# Patient Record
Sex: FEMALE | Race: BLACK OR AFRICAN AMERICAN | Employment: UNEMPLOYED | URBAN - METROPOLITAN AREA
[De-identification: names, ages, dates, MRNs, and addresses within clinical notes are randomized per-mention and may not be internally consistent; named-entity substitution may affect disease eponyms.]

---
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
## 2018-01-17 ENCOUNTER — OFFICE VISIT (OUTPATIENT)
Dept: INTERNAL MEDICINE CLINIC | Age: 18
End: 2018-01-17

## 2018-01-17 ENCOUNTER — INITIAL CONSULT (OUTPATIENT)
Dept: PSYCHIATRY | Age: 18
End: 2018-01-17

## 2018-01-17 VITALS
BODY MASS INDEX: 16.46 KG/M2 | WEIGHT: 108.6 LBS | OXYGEN SATURATION: 98 % | SYSTOLIC BLOOD PRESSURE: 100 MMHG | HEIGHT: 68 IN | HEART RATE: 96 BPM | DIASTOLIC BLOOD PRESSURE: 70 MMHG | TEMPERATURE: 98.7 F

## 2018-01-17 DIAGNOSIS — F33.1 MODERATE EPISODE OF RECURRENT MAJOR DEPRESSIVE DISORDER (HCC): Primary | ICD-10-CM

## 2018-01-17 DIAGNOSIS — E55.9 VITAMIN D DEFICIENCY: ICD-10-CM

## 2018-01-17 DIAGNOSIS — G47.09 OTHER INSOMNIA: ICD-10-CM

## 2018-01-17 DIAGNOSIS — E78.2 MIXED HYPERLIPIDEMIA: Primary | ICD-10-CM

## 2018-01-17 DIAGNOSIS — R63.6 UNDERWEIGHT: ICD-10-CM

## 2018-01-17 DIAGNOSIS — R53.83 OTHER FATIGUE: ICD-10-CM

## 2018-01-17 DIAGNOSIS — M25.562 LEFT KNEE PAIN, UNSPECIFIED CHRONICITY: ICD-10-CM

## 2018-01-17 DIAGNOSIS — F41.8 DEPRESSION WITH ANXIETY: ICD-10-CM

## 2018-01-17 PROCEDURE — 99214 OFFICE O/P EST MOD 30 MIN: CPT | Performed by: INTERNAL MEDICINE

## 2018-01-17 PROCEDURE — 99214 OFFICE O/P EST MOD 30 MIN: CPT | Performed by: PSYCHIATRY & NEUROLOGY

## 2018-01-17 RX ORDER — SERTRALINE HYDROCHLORIDE 100 MG/1
150 TABLET, FILM COATED ORAL DAILY
Qty: 60 TABLET | Refills: 2 | Status: SHIPPED | OUTPATIENT
Start: 2018-01-17 | End: 2018-05-02 | Stop reason: SDUPTHER

## 2018-01-17 RX ORDER — MELATONIN
1000 DAILY
Qty: 30 TABLET | Refills: 3 | Status: SHIPPED | OUTPATIENT
Start: 2018-01-17 | End: 2018-11-28 | Stop reason: SDUPTHER

## 2018-01-17 RX ORDER — M-VIT,TX,IRON,MINS/CALC/FOLIC 27MG-0.4MG
1 TABLET ORAL DAILY
Qty: 30 TABLET | Refills: 3 | Status: SHIPPED | OUTPATIENT
Start: 2018-01-17 | End: 2019-01-17

## 2018-01-17 ASSESSMENT — ENCOUNTER SYMPTOMS
GASTROINTESTINAL NEGATIVE: 1
EYES NEGATIVE: 1
RESPIRATORY NEGATIVE: 1

## 2018-01-17 NOTE — PROGRESS NOTES
Medication Sig Dispense Refill    sertraline (ZOLOFT) 100 MG tablet Take 1.5 tablets by mouth daily 60 tablet 2    Cholecalciferol (VITAMIN D3) 1000 units TABS Take 1 tablet by mouth daily 30 tablet 3    Multiple Vitamins-Minerals (THERAPEUTIC MULTIVITAMIN-MINERALS) tablet Take 1 tablet by mouth daily 30 tablet 3     No current facility-administered medications for this visit.         Labs/Imaging/EKG:    Orders Only on 08/09/2017   Component Date Value Ref Range Status    Hyaline Casts, UA 08/09/2017 9* 0 - 8 /LPF Final    WBC, UA 08/09/2017 1  0 - 5 /HPF Final    RBC, UA 08/09/2017 5* 0 - 4 /HPF Final    Epi Cells 08/09/2017 2  0 - 5 /HPF Final   Orders Only on 08/09/2017   Component Date Value Ref Range Status    WBC 08/09/2017 6.4  4.0 - 11.0 K/uL Final    RBC 08/09/2017 5.01  4.00 - 5.20 M/uL Final    Hemoglobin 08/09/2017 12.4  12.0 - 16.0 g/dL Final    Hematocrit 08/09/2017 38.9  36.0 - 48.0 % Final    MCV 08/09/2017 77.8* 80.0 - 100.0 fL Final    MCH 08/09/2017 24.7* 26.0 - 34.0 pg Final    MCHC 08/09/2017 31.8  31.0 - 36.0 g/dL Final    RDW 08/09/2017 18.2* 12.4 - 15.4 % Final    Platelets 14/20/6787 251  135 - 450 K/uL Final    MPV 08/09/2017 7.5  5.0 - 10.5 fL Final    Neutrophils % 08/09/2017 59.3  % Final    Lymphocytes % 08/09/2017 31.7  % Final    Monocytes % 08/09/2017 6.9  % Final    Eosinophils % 08/09/2017 1.4  % Final    Basophils % 08/09/2017 0.7  % Final    Neutrophils # 08/09/2017 3.8  1.7 - 7.7 K/uL Final    Lymphocytes # 08/09/2017 2.0  1.0 - 5.1 K/uL Final    Monocytes # 08/09/2017 0.4  0.0 - 1.3 K/uL Final    Eosinophils # 08/09/2017 0.1  0.0 - 0.6 K/uL Final    Basophils # 08/09/2017 0.0  0.0 - 0.2 K/uL Final    TSH 08/09/2017 2.21  0.43 - 4.00 uIU/mL Final    Sodium 08/09/2017 143  136 - 145 mmol/L Final    Potassium 08/09/2017 4.2  3.5 - 5.1 mmol/L Final    Chloride 08/09/2017 102  99 - 110 mmol/L Final    CO2 08/09/2017 21  21 - 32 mmol/L Final    Anion Gap 08/09/2017 20* 3 - 16 Final    Glucose 08/09/2017 76  70 - 99 mg/dL Final    BUN 08/09/2017 15  7 - 20 mg/dL Final    CREATININE 08/09/2017 0.6  0.6 - 1.1 mg/dL Final    GFR Non- 08/09/2017 >60  >60 Final    GFR  08/09/2017 >60  >60 Final    Calcium 08/09/2017 9.8  8.3 - 10.6 mg/dL Final    Hemoglobin A1C 08/10/2017 5.0  See comment % Final    eAG 08/10/2017 96.8  mg/dL Final    Total Protein 08/09/2017 7.9  6.4 - 8.2 g/dL Final    Alb 08/09/2017 4.8  3.4 - 5.0 g/dL Final    Alkaline Phosphatase 08/09/2017 49  40 - 129 U/L Final    ALT 08/09/2017 8* 10 - 40 U/L Final    AST 08/09/2017 19  15 - 37 U/L Final    Total Bilirubin 08/09/2017 0.3  0.0 - 1.0 mg/dL Final    Bilirubin, Direct 08/09/2017 <0.2  0.0 - 0.3 mg/dL Final    Bilirubin, Indirect 08/09/2017 see below  0.0 - 1.0 mg/dL Final    Cholesterol, Total 08/09/2017 259* 0 - 199 mg/dL Final    Triglycerides 08/09/2017 79  0 - 150 mg/dL Final    HDL 08/09/2017 101* 40 - 60 mg/dL Final    LDL Calculated 08/09/2017 142* <100 mg/dL Final    VLDL Cholesterol Calculated 08/09/2017 16  Not Established mg/dL Final    Vit D, 25-Hydroxy 08/09/2017 31.0  >=30 ng/mL Final    Vitamin B-12 08/09/2017 724  211 - 911 pg/mL Final    Folate 08/09/2017 12.93  4.78 - 24.20 ng/mL Final   Office Visit on 08/07/2017   Component Date Value Ref Range Status    Color, UA 08/09/2017 DK YELLOW  Straw/Yellow Final    Clarity, UA 08/09/2017 Clear  Clear Final    Glucose, Ur 08/09/2017 Negative  Negative mg/dL Final    Bilirubin Urine 08/09/2017 Negative  Negative Final    Ketones, Urine 08/09/2017 Negative  Negative mg/dL Final    Specific Gravity, UA 08/09/2017 >1.030  1.005 - 1.030 Final    Blood, Urine 08/09/2017 Negative  Negative Final    pH, UA 08/09/2017 6.0  5.0 - 8.0 Final    Protein, UA 08/09/2017 TRACE* Negative mg/dL Final    Urobilinogen, Urine 08/09/2017 1.0  <2.0 E.U./dL Final    Nitrite, Urine

## 2018-01-17 NOTE — PATIENT INSTRUCTIONS
TAKE MED AS ADVISED    DIET/ EXERCISE.     FOLLOW UP WITHIN 3 MONTHS / AS NEEDED    FOLLOW UP FOR FASTING LABS, PSYCHIATRIST

## 2018-01-17 NOTE — PROGRESS NOTES
SUBJECTIVE:  Pillo Aly is a 21 y.o. female [address] Complaint   Patient presents with    Depression     follow up    Hyperlipidemia    Other        PT HERE FOR EVAL    HLP -  + EXERCISE / DIET COMPLIANCE . PERSISTENT. LABS D/W PT  DEPRESSION / ANXIETY -  TAKING ZOLOFT - TOLERATING. + INSOMNIA.  + LACK OF MOTIVATION ,  OCC LOSS OF INTEREST, DENIES SUICIDAL / NO HOMICIDAL THOUGHTS / IDEATION. INSOMNIA - ? DURATION  VIT D DEF -  TAKING MED .  LABS D/W PT  FATIGUE - PERSISTENT. NO COLD / NO HEAT INTOLERANCE  LT KNEE PAIN - IMPROVING. DULL ACHE, NO RAD. PAIN FREE AT THIS TIME. UNDERWT - DIET REVIEWED. WT NOTED    DENIES CP, No SOB, No PALPITATIONS, No COUGH  No ABD PAIN, No N/V, No DIARRHEA, No CONSTIPATION, No MELENA, No HEMATOCHEZIA. No DYSURIA, No FREQ, No URGENCY, NoHEMATURIA    PMH: REVIEWED    PSH: REVIEWED:    ALLERGY:  Review of patient's allergies indicates no known allergies. MEDS: REVIEWED  Medication Sig Taking? Cholecalciferol (VITAMIN D3) 1000 units TABS TAKE 1 TABLET BY MOUTH DAILY    sertraline (ZOLOFT) 100 MG tablet TAKE 1 TABLET BY MOUTH DAILY    sertraline (ZOLOFT) 100 MG tablet Take 1 tablet by mouth daily    Multiple Vitamins-Minerals (THERAPEUTIC MULTIVITAMIN-MINERALS) tablet Take 1 tablet by mouth daily          ROS: COMPREHENSIVE ROS AS IN HX, REST -VE  History obtained from chart review and the patient    OBJECTIVE:   NURSING NOTE AND VITALS REVIEWED  BP 98/62 (Site: Right Arm, Position: Sitting, Cuff Size: Medium Adult)   Pulse 104   Temp 98.7 °F (37.1 °C) (Oral)   Ht 5' 7.5\" (1.715 m)   Wt 108 lb 9.6 oz (49.3 kg)   LMP 12/24/2017 (Approximate)   SpO2 98%   BMI 16.76 kg/m²     NO ACUTE DISTRESS    REPEAT BP:  100/70 (RT), NO ORTHOSTASIS     REPEAT PULSE: 96 -  MANUAL    Body mass index is 16.76 kg/m².      HEENT: NO PALLOR, ANICTERIC, PERRLA, EOMI, NO CONJUNCTIVAL ERYTHEMA,                 NO SINUS TENDERNESS  NECK:  SUPPLE, TRACHEA MIDLINE, NT, NO JVD, NO CB, MEDICATION SIDE EFFECTS D/W PATIENT    PT STABLE AT TIME OF D/C.    RETURN TO CLINIC WITHIN 3 MONTHS / PRN    FOLLOW UP FOR FASTING LABS, PSYCHIATRIST

## 2018-02-01 DIAGNOSIS — E55.9 VITAMIN D DEFICIENCY: ICD-10-CM

## 2018-02-01 DIAGNOSIS — E78.2 MIXED HYPERLIPIDEMIA: ICD-10-CM

## 2018-02-01 DIAGNOSIS — R63.6 UNDERWEIGHT: ICD-10-CM

## 2018-02-01 DIAGNOSIS — R53.83 OTHER FATIGUE: ICD-10-CM

## 2018-02-01 LAB
ALBUMIN SERPL-MCNC: 4.5 G/DL (ref 3.4–5)
ALP BLD-CCNC: 45 U/L (ref 40–129)
ALT SERPL-CCNC: <5 U/L (ref 10–40)
ANION GAP SERPL CALCULATED.3IONS-SCNC: 15 MMOL/L (ref 3–16)
AST SERPL-CCNC: 21 U/L (ref 15–37)
BILIRUB SERPL-MCNC: 0.3 MG/DL (ref 0–1)
BILIRUBIN DIRECT: <0.2 MG/DL (ref 0–0.3)
BILIRUBIN, INDIRECT: ABNORMAL MG/DL (ref 0–1)
BUN BLDV-MCNC: 9 MG/DL (ref 7–20)
CALCIUM SERPL-MCNC: 9.4 MG/DL (ref 8.3–10.6)
CHLORIDE BLD-SCNC: 102 MMOL/L (ref 99–110)
CHOLESTEROL, TOTAL: 226 MG/DL (ref 0–199)
CO2: 25 MMOL/L (ref 21–32)
CREAT SERPL-MCNC: 0.6 MG/DL (ref 0.6–1.1)
GFR AFRICAN AMERICAN: >60
GFR NON-AFRICAN AMERICAN: >60
GLUCOSE BLD-MCNC: 72 MG/DL (ref 70–99)
HDLC SERPL-MCNC: 74 MG/DL (ref 40–60)
LDL CHOLESTEROL CALCULATED: 133 MG/DL
POTASSIUM SERPL-SCNC: 4.4 MMOL/L (ref 3.5–5.1)
SODIUM BLD-SCNC: 142 MMOL/L (ref 136–145)
TOTAL PROTEIN: 7.8 G/DL (ref 6.4–8.2)
TRIGL SERPL-MCNC: 96 MG/DL (ref 0–150)
VITAMIN D 25-HYDROXY: 35.2 NG/ML
VLDLC SERPL CALC-MCNC: 19 MG/DL

## 2018-02-21 ENCOUNTER — INITIAL CONSULT (OUTPATIENT)
Dept: PSYCHIATRY | Age: 18
End: 2018-02-21

## 2018-02-21 VITALS
WEIGHT: 107 LBS | BODY MASS INDEX: 16.22 KG/M2 | OXYGEN SATURATION: 97 % | DIASTOLIC BLOOD PRESSURE: 68 MMHG | HEIGHT: 68 IN | HEART RATE: 87 BPM | SYSTOLIC BLOOD PRESSURE: 90 MMHG

## 2018-02-21 DIAGNOSIS — F33.0 MILD EPISODE OF RECURRENT MAJOR DEPRESSIVE DISORDER (HCC): Primary | ICD-10-CM

## 2018-02-21 PROCEDURE — 99214 OFFICE O/P EST MOD 30 MIN: CPT | Performed by: PSYCHIATRY & NEUROLOGY

## 2018-02-21 ASSESSMENT — ENCOUNTER SYMPTOMS
EYES NEGATIVE: 1
GASTROINTESTINAL NEGATIVE: 1
RESPIRATORY NEGATIVE: 1

## 2018-02-21 NOTE — PROGRESS NOTES
Outpatient Psychiatric Progress Note  Michelle Kilgore M.D. Date: 2/21/2018    Total Duration of Visit: 25min    Vital Signs: BP 90/68   Pulse 87   Ht 5' 7.5\" (1.715 m)   Wt 107 lb (48.5 kg)   SpO2 97%   BMI 16.51 kg/m²     Chief Complaint/Reason For Visit:   Chief Complaint   Patient presents with    Depression       Interval History: Some improvement since increasing zoloft. Less depressed, less solation (making plans with friends), slight more motivation (cleaned room), eating more (weight same), sleep good. Focus good. No anxiety. Started seeing therapist at [facility]. Has only met one time. Continues to be dependent on mother. Does not have job, school, extra curricular. No sense of accomplishment or purpose. Pt easily rejects ideas that would support her independence. Mother may need to be involved if she is enabling. No SI/HI. MSE:     Appropriate grooming and hygiene. Appears stated age. reduced eye contact. Speech spontaneous and non-pressured. Mood depressed  Affect congruent. TP linear. TC free of SI/HI, no evidence of IOR/IOP. Pt denies any perceptual abnormalities. The pt did not exhibit abnormal movements or tremor. The pt was not restless or agitated. The pt was alert and oriented in all spheres. Pt demonstrated good fund of knowledge and good recall of recent and remote events. The patient demonstrated an appropriate level of insight into their diagnosis and treatment recommendations, including risks, benefits and alternatives of the recommended medications and therapy interventions. Review of Systems   HENT: Negative. Eyes: Negative. Respiratory: Negative. Cardiovascular: Negative. Gastrointestinal: Negative. Skin: Negative. Neurological: Negative. Endo/Heme/Allergies: Negative. Psychiatric/Behavioral: Positive for depression.          Current Medications:   Current Outpatient Prescriptions   Medication Sig Dispense Refill    Cholecalciferol in her environment to help her feel better. Continue zoloft  150mg daily. Reviewed R/B/SE. Continue therapy, rec more frequent visits. Encouraged work, school, or volunteer work to help structure day and gain sense of accomplishment. No safety concerns.   F/U in 1

## 2018-02-24 DIAGNOSIS — E55.9 VITAMIN D DEFICIENCY: ICD-10-CM

## 2018-02-26 RX ORDER — MELATONIN
1000 DAILY
Qty: 30 TABLET | Refills: 0 | Status: SHIPPED | OUTPATIENT
Start: 2018-02-26 | End: 2018-11-28 | Stop reason: SDUPTHER

## 2018-03-16 ENCOUNTER — TELEPHONE (OUTPATIENT)
Dept: INTERNAL MEDICINE CLINIC | Age: 18
End: 2018-03-16

## 2018-05-02 ENCOUNTER — INITIAL CONSULT (OUTPATIENT)
Dept: PSYCHIATRY | Age: 18
End: 2018-05-02

## 2018-05-02 DIAGNOSIS — F33.42 RECURRENT MAJOR DEPRESSIVE DISORDER, IN FULL REMISSION (HCC): ICD-10-CM

## 2018-05-02 PROCEDURE — 99213 OFFICE O/P EST LOW 20 MIN: CPT | Performed by: PSYCHIATRY & NEUROLOGY

## 2018-05-02 RX ORDER — SERTRALINE HYDROCHLORIDE 100 MG/1
150 TABLET, FILM COATED ORAL DAILY
Qty: 60 TABLET | Refills: 5 | Status: SHIPPED | OUTPATIENT
Start: 2018-05-02 | End: 2018-05-05 | Stop reason: SDUPTHER

## 2018-05-02 ASSESSMENT — ENCOUNTER SYMPTOMS
GASTROINTESTINAL NEGATIVE: 1
EYES NEGATIVE: 1
RESPIRATORY NEGATIVE: 1

## 2018-05-05 DIAGNOSIS — F33.42 RECURRENT MAJOR DEPRESSIVE DISORDER, IN FULL REMISSION (HCC): ICD-10-CM

## 2018-05-08 RX ORDER — SERTRALINE HYDROCHLORIDE 100 MG/1
TABLET, FILM COATED ORAL
Qty: 135 TABLET | Refills: 5 | Status: SHIPPED | OUTPATIENT
Start: 2018-05-08 | End: 2018-05-15

## 2018-05-11 DIAGNOSIS — F33.1 MODERATE EPISODE OF RECURRENT MAJOR DEPRESSIVE DISORDER (HCC): ICD-10-CM

## 2018-05-15 RX ORDER — SERTRALINE HYDROCHLORIDE 100 MG/1
TABLET, FILM COATED ORAL
Qty: 60 TABLET | Refills: 0 | Status: SHIPPED | OUTPATIENT
Start: 2018-05-15 | End: 2018-11-28

## 2018-11-28 ENCOUNTER — OFFICE VISIT (OUTPATIENT)
Dept: INTERNAL MEDICINE CLINIC | Age: 18
End: 2018-11-28
Payer: COMMERCIAL

## 2018-11-28 VITALS
SYSTOLIC BLOOD PRESSURE: 110 MMHG | HEIGHT: 68 IN | DIASTOLIC BLOOD PRESSURE: 70 MMHG | WEIGHT: 116.4 LBS | BODY MASS INDEX: 17.64 KG/M2 | HEART RATE: 75 BPM | OXYGEN SATURATION: 95 % | TEMPERATURE: 97.9 F

## 2018-11-28 DIAGNOSIS — G47.09 OTHER INSOMNIA: ICD-10-CM

## 2018-11-28 DIAGNOSIS — R53.83 OTHER FATIGUE: ICD-10-CM

## 2018-11-28 DIAGNOSIS — E78.2 MIXED HYPERLIPIDEMIA: ICD-10-CM

## 2018-11-28 DIAGNOSIS — E55.9 VITAMIN D DEFICIENCY: ICD-10-CM

## 2018-11-28 DIAGNOSIS — F33.40 RECURRENT MAJOR DEPRESSIVE DISORDER, IN REMISSION (HCC): Primary | ICD-10-CM

## 2018-11-28 DIAGNOSIS — R63.6 UNDERWEIGHT: ICD-10-CM

## 2018-11-28 DIAGNOSIS — Z23 NEED FOR IMMUNIZATION AGAINST INFLUENZA: ICD-10-CM

## 2018-11-28 PROCEDURE — 99214 OFFICE O/P EST MOD 30 MIN: CPT | Performed by: INTERNAL MEDICINE

## 2018-11-28 PROCEDURE — 90686 IIV4 VACC NO PRSV 0.5 ML IM: CPT | Performed by: INTERNAL MEDICINE

## 2018-11-28 PROCEDURE — 90471 IMMUNIZATION ADMIN: CPT | Performed by: INTERNAL MEDICINE

## 2018-11-28 RX ORDER — MELATONIN
1000 DAILY
Qty: 30 TABLET | Refills: 0 | Status: SHIPPED | OUTPATIENT
Start: 2018-11-28 | End: 2018-11-30 | Stop reason: SDUPTHER

## 2018-11-28 RX ORDER — BUPROPION HYDROCHLORIDE 150 MG/1
150 TABLET ORAL EVERY MORNING
Qty: 30 TABLET | Refills: 2 | Status: SHIPPED | OUTPATIENT
Start: 2018-11-28 | End: 2019-03-19 | Stop reason: ALTCHOICE

## 2018-11-28 NOTE — PROGRESS NOTES
Barriers to medication compliance addressed. All patient questions answered. Pt voiced understanding.        MEDICATION SIDE EFFECTS D/W PATIENT    PT STABLE AT TIME OF D/C.    RETURN TO CLINIC WITHIN 2 MONTHS / PRN    FOLLOW UP FOR FASTING LABS

## 2018-11-28 NOTE — PATIENT INSTRUCTIONS
TAKE MED AS ADVISED    DIET/ EXERCISE.     FOLLOW UP WITHIN 2 MONTHS / AS NEEDED    FOLLOW UP FOR FASTING LABS

## 2018-12-19 DIAGNOSIS — E55.9 VITAMIN D DEFICIENCY: ICD-10-CM

## 2018-12-19 DIAGNOSIS — E78.2 MIXED HYPERLIPIDEMIA: ICD-10-CM

## 2018-12-19 DIAGNOSIS — F33.40 RECURRENT MAJOR DEPRESSIVE DISORDER, IN REMISSION (HCC): ICD-10-CM

## 2018-12-19 DIAGNOSIS — R53.83 OTHER FATIGUE: ICD-10-CM

## 2018-12-19 LAB
A/G RATIO: 1.4 (ref 1.1–2.2)
ALBUMIN SERPL-MCNC: 4.6 G/DL (ref 3.4–5)
ALP BLD-CCNC: 52 U/L (ref 40–129)
ALT SERPL-CCNC: <5 U/L (ref 10–40)
ANION GAP SERPL CALCULATED.3IONS-SCNC: 14 MMOL/L (ref 3–16)
AST SERPL-CCNC: 14 U/L (ref 15–37)
BASOPHILS ABSOLUTE: 0 K/UL (ref 0–0.2)
BASOPHILS RELATIVE PERCENT: 0.7 %
BILIRUB SERPL-MCNC: 0.5 MG/DL (ref 0–1)
BUN BLDV-MCNC: 13 MG/DL (ref 7–20)
CALCIUM SERPL-MCNC: 9.5 MG/DL (ref 8.3–10.6)
CHLORIDE BLD-SCNC: 101 MMOL/L (ref 99–110)
CHOLESTEROL, TOTAL: 248 MG/DL (ref 0–199)
CO2: 24 MMOL/L (ref 21–32)
CREAT SERPL-MCNC: 0.7 MG/DL (ref 0.6–1.1)
EOSINOPHILS ABSOLUTE: 0.1 K/UL (ref 0–0.6)
EOSINOPHILS RELATIVE PERCENT: 1.1 %
FOLATE: 14.6 NG/ML (ref 4.78–24.2)
GFR AFRICAN AMERICAN: >60
GFR NON-AFRICAN AMERICAN: >60
GLOBULIN: 3.3 G/DL
GLUCOSE BLD-MCNC: 74 MG/DL (ref 70–99)
HCT VFR BLD CALC: 40.3 % (ref 36–48)
HDLC SERPL-MCNC: 79 MG/DL (ref 40–60)
HEMOGLOBIN: 13.3 G/DL (ref 12–16)
LDL CHOLESTEROL CALCULATED: 153 MG/DL
LYMPHOCYTES ABSOLUTE: 1.4 K/UL (ref 1–5.1)
LYMPHOCYTES RELATIVE PERCENT: 26.1 %
MCH RBC QN AUTO: 26.8 PG (ref 26–34)
MCHC RBC AUTO-ENTMCNC: 33 G/DL (ref 31–36)
MCV RBC AUTO: 81.4 FL (ref 80–100)
MONOCYTES ABSOLUTE: 0.3 K/UL (ref 0–1.3)
MONOCYTES RELATIVE PERCENT: 6.3 %
NEUTROPHILS ABSOLUTE: 3.6 K/UL (ref 1.7–7.7)
NEUTROPHILS RELATIVE PERCENT: 65.8 %
PDW BLD-RTO: 14.6 % (ref 12.4–15.4)
PLATELET # BLD: 296 K/UL (ref 135–450)
PMV BLD AUTO: 7.3 FL (ref 5–10.5)
POTASSIUM SERPL-SCNC: 4.3 MMOL/L (ref 3.5–5.1)
RBC # BLD: 4.95 M/UL (ref 4–5.2)
SODIUM BLD-SCNC: 139 MMOL/L (ref 136–145)
TOTAL PROTEIN: 7.9 G/DL (ref 6.4–8.2)
TRIGL SERPL-MCNC: 80 MG/DL (ref 0–150)
TSH REFLEX: 1.76 UIU/ML (ref 0.27–4.2)
VITAMIN B-12: 835 PG/ML (ref 211–911)
VITAMIN D 25-HYDROXY: 34 NG/ML
VLDLC SERPL CALC-MCNC: 16 MG/DL
WBC # BLD: 5.5 K/UL (ref 4–11)

## 2019-01-03 ENCOUNTER — TELEPHONE (OUTPATIENT)
Dept: INTERNAL MEDICINE CLINIC | Age: 19
End: 2019-01-03

## 2019-01-08 ENCOUNTER — TELEPHONE (OUTPATIENT)
Dept: INTERNAL MEDICINE CLINIC | Age: 19
End: 2019-01-08

## 2019-03-19 ENCOUNTER — OFFICE VISIT (OUTPATIENT)
Dept: INTERNAL MEDICINE CLINIC | Age: 19
End: 2019-03-19
Payer: MEDICARE

## 2019-03-19 VITALS
BODY MASS INDEX: 19.64 KG/M2 | WEIGHT: 129.6 LBS | HEIGHT: 68 IN | OXYGEN SATURATION: 97 % | TEMPERATURE: 98.5 F | HEART RATE: 90 BPM | SYSTOLIC BLOOD PRESSURE: 118 MMHG | DIASTOLIC BLOOD PRESSURE: 80 MMHG

## 2019-03-19 DIAGNOSIS — R63.6 UNDERWEIGHT: ICD-10-CM

## 2019-03-19 DIAGNOSIS — E78.2 MIXED HYPERLIPIDEMIA: ICD-10-CM

## 2019-03-19 DIAGNOSIS — E55.9 VITAMIN D DEFICIENCY: ICD-10-CM

## 2019-03-19 DIAGNOSIS — R53.83 OTHER FATIGUE: ICD-10-CM

## 2019-03-19 DIAGNOSIS — G47.09 OTHER INSOMNIA: ICD-10-CM

## 2019-03-19 DIAGNOSIS — F33.9 RECURRENT MAJOR DEPRESSIVE DISORDER, REMISSION STATUS UNSPECIFIED (HCC): Primary | ICD-10-CM

## 2019-03-19 PROCEDURE — G8482 FLU IMMUNIZE ORDER/ADMIN: HCPCS | Performed by: INTERNAL MEDICINE

## 2019-03-19 PROCEDURE — G8427 DOCREV CUR MEDS BY ELIG CLIN: HCPCS | Performed by: INTERNAL MEDICINE

## 2019-03-19 PROCEDURE — 99214 OFFICE O/P EST MOD 30 MIN: CPT | Performed by: INTERNAL MEDICINE

## 2019-03-19 PROCEDURE — G8420 CALC BMI NORM PARAMETERS: HCPCS | Performed by: INTERNAL MEDICINE

## 2019-03-19 PROCEDURE — 1036F TOBACCO NON-USER: CPT | Performed by: INTERNAL MEDICINE

## 2019-03-19 RX ORDER — MIRTAZAPINE 15 MG/1
15 TABLET, FILM COATED ORAL NIGHTLY
COMMUNITY
End: 2019-05-30 | Stop reason: DRUGHIGH

## 2019-03-19 ASSESSMENT — PATIENT HEALTH QUESTIONNAIRE - PHQ9
2. FEELING DOWN, DEPRESSED OR HOPELESS: 1
SUM OF ALL RESPONSES TO PHQ QUESTIONS 1-9: 1
1. LITTLE INTEREST OR PLEASURE IN DOING THINGS: 0
SUM OF ALL RESPONSES TO PHQ9 QUESTIONS 1 & 2: 1
SUM OF ALL RESPONSES TO PHQ QUESTIONS 1-9: 1

## 2019-05-07 ENCOUNTER — TELEPHONE (OUTPATIENT)
Dept: INTERNAL MEDICINE CLINIC | Age: 19
End: 2019-05-07

## 2019-05-07 DIAGNOSIS — E55.9 VITAMIN D DEFICIENCY: ICD-10-CM

## 2019-05-07 DIAGNOSIS — E78.2 MIXED HYPERLIPIDEMIA: ICD-10-CM

## 2019-05-07 LAB
CHOLESTEROL, TOTAL: 277 MG/DL (ref 0–199)
HDLC SERPL-MCNC: 88 MG/DL (ref 40–60)
LDL CHOLESTEROL CALCULATED: 164 MG/DL
TRIGL SERPL-MCNC: 127 MG/DL (ref 0–150)
VITAMIN D 25-HYDROXY: 26.6 NG/ML
VLDLC SERPL CALC-MCNC: 25 MG/DL

## 2019-05-07 NOTE — TELEPHONE ENCOUNTER
Patient is here in office and requesting refill     Mirtazapine 15 mg tab    Medication was prescribed by another provider      [employer] # [phone]

## 2019-05-30 ENCOUNTER — TELEPHONE (OUTPATIENT)
Dept: INTERNAL MEDICINE CLINIC | Age: 19
End: 2019-05-30

## 2019-05-30 RX ORDER — MIRTAZAPINE 15 MG/1
15 TABLET, FILM COATED ORAL NIGHTLY
Qty: 30 TABLET | Refills: 1 | Status: SHIPPED | COMMUNITY
Start: 2019-05-30 | End: 2019-06-04 | Stop reason: SDUPTHER

## 2019-06-04 ENCOUNTER — OFFICE VISIT (OUTPATIENT)
Dept: INTERNAL MEDICINE CLINIC | Age: 19
End: 2019-06-04
Payer: MEDICARE

## 2019-06-04 VITALS
WEIGHT: 130 LBS | HEART RATE: 90 BPM | BODY MASS INDEX: 19.7 KG/M2 | SYSTOLIC BLOOD PRESSURE: 110 MMHG | HEIGHT: 68 IN | DIASTOLIC BLOOD PRESSURE: 80 MMHG | OXYGEN SATURATION: 97 % | TEMPERATURE: 98.6 F

## 2019-06-04 DIAGNOSIS — E78.2 MIXED HYPERLIPIDEMIA: ICD-10-CM

## 2019-06-04 DIAGNOSIS — E55.9 VITAMIN D DEFICIENCY: ICD-10-CM

## 2019-06-04 DIAGNOSIS — R21 RASH: ICD-10-CM

## 2019-06-04 DIAGNOSIS — F33.9 RECURRENT MAJOR DEPRESSIVE DISORDER, REMISSION STATUS UNSPECIFIED (HCC): Primary | ICD-10-CM

## 2019-06-04 DIAGNOSIS — R63.6 UNDERWEIGHT: ICD-10-CM

## 2019-06-04 DIAGNOSIS — G47.09 OTHER INSOMNIA: ICD-10-CM

## 2019-06-04 DIAGNOSIS — R53.83 OTHER FATIGUE: ICD-10-CM

## 2019-06-04 PROCEDURE — 1036F TOBACCO NON-USER: CPT | Performed by: INTERNAL MEDICINE

## 2019-06-04 PROCEDURE — 99214 OFFICE O/P EST MOD 30 MIN: CPT | Performed by: INTERNAL MEDICINE

## 2019-06-04 PROCEDURE — G8420 CALC BMI NORM PARAMETERS: HCPCS | Performed by: INTERNAL MEDICINE

## 2019-06-04 PROCEDURE — G8427 DOCREV CUR MEDS BY ELIG CLIN: HCPCS | Performed by: INTERNAL MEDICINE

## 2019-06-04 RX ORDER — MIRTAZAPINE 15 MG/1
15 TABLET, FILM COATED ORAL NIGHTLY
Qty: 30 TABLET | Refills: 1 | Status: SHIPPED | OUTPATIENT
Start: 2019-06-04

## 2019-06-04 NOTE — PROGRESS NOTES
SUBJECTIVE:  Michael Miguel is a 22 y.o. female [address] Complaint   Patient presents with    Referral - General    Depression        PT HERE FOR EVAL    MAJOR DEPRESSION -  ON REMERON -HAD BEEN OUT OF MED - TAKING NOW. [address] OF MOTIVATION - IMPROVING ,  LOSS OF INTEREST - IMPROVING, DENIES SUICIDAL / NO HOMICIDAL THOUGHTS / IDEATION. HAS NOT FOLLOWED WITH PSYCH - NEEDS NEW REFERRAL  [address] [place] [address] . HLP -  + EXERCISE / DIET COMPLIANCE . PERSISTENT. UNCONTROLLED - LABS D/W PT  FATIGUE - IMPROVED. NO COLD / NO HEAT INTOLERANCE  VIT D DEF -  ? MED COMPLIANCE .  LABS D/W PT  UNDERWT - IMPROVING. WT NOTED. DIET REVIEWED      DENIES CP, No SOB, No PALPITATIONS, No COUGH  No ABD PAIN, No N/V, No DIARRHEA, No CONSTIPATION, No MELENA, No HEMATOCHEZIA. No DYSURIA, NoFREQ, No URGENCY, NoHEMATURIA    PMH: REVIEWED AND UPDATED TODAY    PSH: REVIEWED AND UPDATED TODAY    SOCIAL HX: REVIEWED AND UPDATED TODAY    FAMILY HX: REVIEWED AND UPDATED TODAY    ALLERGY:  Patient has no known allergies. MEDS: REVIEWED  Prior to Visit Medications    Medication Sig Taking? Authorizing Provider   mirtazapine (REMERON) 15 MG tablet Take 1 tablet by mouth nightly  Mary Frey MD   vitamin D (CHOLECALCIFEROL) 1000 UNIT TABS tablet Take 1 tablet by mouth daily  Mary Frey MD   Multiple Vitamins-Minerals (THERAPEUTIC MULTIVITAMIN-MINERALS) tablet Take 1 tablet by mouth daily  Mary Frey MD       ROS: COMPREHENSIVE ROS AS IN HX, REST -VE  History obtained from chart review and the patient    OBJECTIVE:   NURSING NOTE AND VITALS REVIEWED  /70 (Site: Right Upper Arm, Position: Sitting, Cuff Size: Small Adult)   Pulse 113   Temp 98.6 °F (37 °C) (Oral)   Ht 5' 7.5\" (1.715 m)   Wt 130 lb (59 kg)   LMP 06/03/2019   SpO2 97%   BMI 20.06 kg/m²     NO ACUTE DISTRESS    REPEAT BP:  110/80 (LT), NO ORTHOSTASIS     REPEAT PULSE: 90 - MANUAL    Body mass index is 20.06 kg/m². HEENT: NO PALLOR, ANICTERIC, PERRLA, EOMI, NO CONJUNCTIVAL ERYTHEMA,                 NO SINUS TENDERNESS  NECK:  SUPPLE, TRACHEA MIDLINE, NT, NO JVD, NO CB, NO LA, NO TM, NO STIFFNESS  CHEST: RESPY EFFORT NL, GOOD AE, NO W/R/C  HEART: S1S2+ REG, NO M/G/R  ABD: SOFT, NT, NO HSM, BS+  EXT: NO EDEMA, NT, PULSES +. CINDA'S -VE  NEURO: ALERT AND ORIENTED X 3, NO MENINGEAL SIGNS, NO TREMORS, NL GAIT, NO FOCAL DEFICITS  PSYCH: FAIRLY GOOD AFFECT  BACK: NT, NO ROM, NO CVA TENDERNESS  SKIN: SCALY RASH NOTED - FACE      PREVIOUS LABS  REVIEWED AND D/W PT       ASSESSMENT / PLAN:     Diagnosis Orders   1. Recurrent major depressive disorder, remission status unspecified ([facility] [address]  COUNSELLED. CONTINUE CURRENT MED. REMERON 15 MG REFILLED  NEW REFERRAL TO  - DR REYNOLDS  PT COUNSELLED  ON RELAXATION TECHNIQUES. ADDRESSED STRESS MGT. MONITOR  PT NOT SUICIDAL/ NOT HOMICIDAL  MAKE CHANGES AS NEEDED. 2. Other insomnia  COUNSELLED. IMPROVING. SLEEP HYGIENE ADVISED. MONITOR  MAKE CHANGES AS NEEDED. 3. Mixed hyperlipidemia  COUNSELLED. PT DEFERRED MED  ADVISED LOW FAT / CHOL DIET/ EXERCISE.  MONITOR - REEVAL. GOALS D/W PT.  MAKE CHANGES AS NEEDED. 4. Other fatigue  COUNSELLED. IMPROVING. ADVISED LIFESTYLE MODIFICATION. MAKE CHANGES AS NEEDED. 5. Vitamin D deficiency  COUNSELLED. PT DEFERRED MED CHANGE  ADVISED MED COMPLIANCE - ADVISED VIT D 1000 U DAILY. MONITOR AND MAKE CHANGES AS NEEDED. 6. Underweight  COUNSELLED. DIET REVIEWED AND  ADVISED-  AT LEAST 3 MEALS DAILY WITH DIETARY SUPPLEMENT. MONITOR WT.   TSH NL ON RECENT LABS  MONITOR  MAKE CHANGES AS NEEDED. 7. Rash  COUNSELLED. ADVISED MAINTAIN HYGIENE  PT PREFERS OTC MED. MONITOR  MAKE CHANGES AS NEEDED.                  [address]ad received counseling on the following healthy behaviors: nutrition, exercise and medication adherence    Patient given educational materials on Hyperlipidemia, Nutrition and Exercise    I have instructed Dara to complete a self tracking handout on [employer] and instructed them to bring it with them to her next appointment. Discussed use, benefit, and side effects of prescribed medications. Barriers to medication compliance addressed. All patient questions answered. Pt voiced understanding.            MEDICATION SIDE EFFECTS D/W PATIENT    PT STABLE AT TIME OF D/C.    RETURN TO CLINIC WITHIN 3 MONTHS / PRN    FOLLOW UP FOR FASTING LABS, PSYCHIATRIST

## 2023-02-15 NOTE — PATIENT INSTRUCTIONS
TAKE MED AS ADVISED    DIET/ EXERCISE.     FOLLOW UP WITHIN 3 MONTHS / AS NEEDED    FOLLOW UP FOR FASTING LABS, PSYCHIATRIST
unk
none